# Patient Record
Sex: MALE | Race: BLACK OR AFRICAN AMERICAN | NOT HISPANIC OR LATINO | Employment: FULL TIME | RURAL
[De-identification: names, ages, dates, MRNs, and addresses within clinical notes are randomized per-mention and may not be internally consistent; named-entity substitution may affect disease eponyms.]

---

## 2021-12-08 ENCOUNTER — CLINICAL SUPPORT (OUTPATIENT)
Dept: FAMILY MEDICINE | Facility: CLINIC | Age: 51
End: 2021-12-08
Payer: COMMERCIAL

## 2021-12-08 DIAGNOSIS — Z23 NEED FOR VACCINATION: Primary | ICD-10-CM

## 2021-12-08 PROCEDURE — 0064A COVID-19, MRNA, LNP-S, PF, 100 MCG/0.25 ML DOSE VACCINE (MODERNA BOOSTER): CPT | Mod: ,,, | Performed by: FAMILY MEDICINE

## 2021-12-08 PROCEDURE — 0064A COVID-19, MRNA, LNP-S, PF, 100 MCG/0.25 ML DOSE VACCINE (MODERNA BOOSTER): ICD-10-PCS | Mod: ,,, | Performed by: FAMILY MEDICINE

## 2021-12-08 PROCEDURE — 91306 COVID-19, MRNA, LNP-S, PF, 100 MCG/0.25 ML DOSE VACCINE (MODERNA BOOSTER): ICD-10-PCS | Mod: ,,, | Performed by: FAMILY MEDICINE

## 2021-12-08 PROCEDURE — 91306 COVID-19, MRNA, LNP-S, PF, 100 MCG/0.25 ML DOSE VACCINE (MODERNA BOOSTER): CPT | Mod: ,,, | Performed by: FAMILY MEDICINE

## 2023-03-30 ENCOUNTER — PATIENT OUTREACH (OUTPATIENT)
Dept: FAMILY MEDICINE | Facility: CLINIC | Age: 53
End: 2023-03-30
Payer: COMMERCIAL

## 2024-09-12 ENCOUNTER — HOSPITAL ENCOUNTER (OUTPATIENT)
Dept: RADIOLOGY | Facility: HOSPITAL | Age: 54
Discharge: HOME | End: 2024-09-12
Attending: ORTHOPAEDIC SURGERY
Payer: COMMERCIAL

## 2024-09-12 ENCOUNTER — OFFICE VISIT (OUTPATIENT)
Dept: SPINE | Facility: CLINIC | Age: 54
End: 2024-09-12
Payer: COMMERCIAL

## 2024-09-12 ENCOUNTER — OFFICE VISIT (OUTPATIENT)
Dept: FAMILY MEDICINE | Facility: CLINIC | Age: 54
End: 2024-09-12
Payer: COMMERCIAL

## 2024-09-12 VITALS
BODY MASS INDEX: 35.99 KG/M2 | TEMPERATURE: 98 F | HEIGHT: 69 IN | RESPIRATION RATE: 20 BRPM | DIASTOLIC BLOOD PRESSURE: 96 MMHG | WEIGHT: 243 LBS | OXYGEN SATURATION: 96 % | SYSTOLIC BLOOD PRESSURE: 140 MMHG | HEART RATE: 89 BPM

## 2024-09-12 DIAGNOSIS — M54.42 ACUTE BILATERAL LOW BACK PAIN WITH BILATERAL SCIATICA: ICD-10-CM

## 2024-09-12 DIAGNOSIS — R29.898 WEAKNESS OF BOTH LEGS: Primary | ICD-10-CM

## 2024-09-12 DIAGNOSIS — M54.16 LUMBAR RADICULOPATHY, CHRONIC: Primary | ICD-10-CM

## 2024-09-12 DIAGNOSIS — M54.41 ACUTE BILATERAL LOW BACK PAIN WITH BILATERAL SCIATICA: ICD-10-CM

## 2024-09-12 DIAGNOSIS — Z78.9 STATIN INTOLERANCE: ICD-10-CM

## 2024-09-12 DIAGNOSIS — Z12.11 SCREENING FOR MALIGNANT NEOPLASM OF COLON: ICD-10-CM

## 2024-09-12 DIAGNOSIS — R29.898 WEAKNESS OF BOTH LEGS: ICD-10-CM

## 2024-09-12 DIAGNOSIS — R03.0 ELEVATED BLOOD PRESSURE READING: ICD-10-CM

## 2024-09-12 DIAGNOSIS — E78.5 HYPERLIPIDEMIA, UNSPECIFIED HYPERLIPIDEMIA TYPE: ICD-10-CM

## 2024-09-12 LAB
ALBUMIN SERPL BCP-MCNC: 3.7 G/DL (ref 3.5–5)
ALBUMIN/GLOB SERPL: 0.9 {RATIO}
ALP SERPL-CCNC: 57 U/L (ref 45–115)
ALT SERPL W P-5'-P-CCNC: 34 U/L (ref 16–61)
ANION GAP SERPL CALCULATED.3IONS-SCNC: 8 MMOL/L (ref 7–16)
AST SERPL W P-5'-P-CCNC: 20 U/L (ref 15–37)
BASOPHILS # BLD AUTO: 0.03 K/UL (ref 0–0.2)
BASOPHILS NFR BLD AUTO: 0.6 % (ref 0–1)
BILIRUB SERPL-MCNC: 0.6 MG/DL (ref ?–1.2)
BUN SERPL-MCNC: 14 MG/DL (ref 7–18)
BUN/CREAT SERPL: 14 (ref 6–20)
CALCIUM SERPL-MCNC: 9.4 MG/DL (ref 8.5–10.1)
CHLORIDE SERPL-SCNC: 110 MMOL/L (ref 98–107)
CHOLEST SERPL-MCNC: 286 MG/DL (ref 0–200)
CHOLEST/HDLC SERPL: 5.8 {RATIO}
CO2 SERPL-SCNC: 28 MMOL/L (ref 21–32)
CREAT SERPL-MCNC: 1.02 MG/DL (ref 0.7–1.3)
DIFFERENTIAL METHOD BLD: ABNORMAL
EGFR (NO RACE VARIABLE) (RUSH/TITUS): 88 ML/MIN/1.73M2
EKG 12-LEAD: NORMAL
EOSINOPHIL # BLD AUTO: 0.07 K/UL (ref 0–0.5)
EOSINOPHIL NFR BLD AUTO: 1.4 % (ref 1–4)
ERYTHROCYTE [DISTWIDTH] IN BLOOD BY AUTOMATED COUNT: 15 % (ref 11.5–14.5)
GLOBULIN SER-MCNC: 4 G/DL (ref 2–4)
GLUCOSE SERPL-MCNC: 101 MG/DL (ref 74–106)
HCT VFR BLD AUTO: 42.3 % (ref 40–54)
HDLC SERPL-MCNC: 49 MG/DL (ref 40–60)
HGB BLD-MCNC: 13.4 G/DL (ref 13.5–18)
IMM GRANULOCYTES # BLD AUTO: 0.01 K/UL (ref 0–0.04)
IMM GRANULOCYTES NFR BLD: 0.2 % (ref 0–0.4)
LDLC SERPL CALC-MCNC: 183 MG/DL
LDLC/HDLC SERPL: 3.7 {RATIO}
LYMPHOCYTES # BLD AUTO: 1.32 K/UL (ref 1–4.8)
LYMPHOCYTES NFR BLD AUTO: 26.9 % (ref 27–41)
MCH RBC QN AUTO: 28.4 PG (ref 27–31)
MCHC RBC AUTO-ENTMCNC: 31.7 G/DL (ref 32–36)
MCV RBC AUTO: 89.6 FL (ref 80–96)
MONOCYTES # BLD AUTO: 0.3 K/UL (ref 0–0.8)
MONOCYTES NFR BLD AUTO: 6.1 % (ref 2–6)
MPC BLD CALC-MCNC: 10 FL (ref 9.4–12.4)
NEUTROPHILS # BLD AUTO: 3.18 K/UL (ref 1.8–7.7)
NEUTROPHILS NFR BLD AUTO: 64.8 % (ref 53–65)
NONHDLC SERPL-MCNC: 237 MG/DL
NRBC # BLD AUTO: 0 X10E3/UL
NRBC, AUTO (.00): 0 %
PLATELET # BLD AUTO: 268 K/UL (ref 150–400)
POTASSIUM SERPL-SCNC: 4.3 MMOL/L (ref 3.5–5.1)
PR INTERVAL: NORMAL
PROT SERPL-MCNC: 7.7 G/DL (ref 6.4–8.2)
PRT AXES: NORMAL
QRS DURATION: NORMAL
QT/QTC: NORMAL
RBC # BLD AUTO: 4.72 M/UL (ref 4.6–6.2)
SODIUM SERPL-SCNC: 142 MMOL/L (ref 136–145)
TRIGL SERPL-MCNC: 270 MG/DL (ref 35–150)
VENTRICULAR RATE: NORMAL
VLDLC SERPL-MCNC: 54 MG/DL
WBC # BLD AUTO: 4.91 K/UL (ref 4.5–11)

## 2024-09-12 PROCEDURE — 72110 X-RAY EXAM L-2 SPINE 4/>VWS: CPT | Mod: TC

## 2024-09-12 PROCEDURE — 85025 COMPLETE CBC W/AUTO DIFF WBC: CPT | Mod: ,,, | Performed by: CLINICAL MEDICAL LABORATORY

## 2024-09-12 PROCEDURE — 80061 LIPID PANEL: CPT | Mod: ,,, | Performed by: CLINICAL MEDICAL LABORATORY

## 2024-09-12 PROCEDURE — 99999 PR PBB SHADOW E&M-EST. PATIENT-LVL III: CPT | Mod: PBBFAC,,, | Performed by: ORTHOPAEDIC SURGERY

## 2024-09-12 PROCEDURE — 72110 X-RAY EXAM L-2 SPINE 4/>VWS: CPT | Mod: 26,,, | Performed by: ORTHOPAEDIC SURGERY

## 2024-09-12 PROCEDURE — 80053 COMPREHEN METABOLIC PANEL: CPT | Mod: ,,, | Performed by: CLINICAL MEDICAL LABORATORY

## 2024-09-12 RX ORDER — GABAPENTIN 300 MG/1
300 CAPSULE ORAL 3 TIMES DAILY
Qty: 90 CAPSULE | Refills: 5 | Status: SHIPPED | OUTPATIENT
Start: 2024-09-12

## 2024-09-12 RX ORDER — LATANOPROSTENE BUNOD 0.24 MG/ML
1 SOLUTION/ DROPS OPHTHALMIC NIGHTLY
COMMUNITY
Start: 2024-08-16

## 2024-09-12 RX ORDER — IBUPROFEN 200 MG
200 TABLET ORAL EVERY 6 HOURS PRN
COMMUNITY

## 2024-09-12 NOTE — PROGRESS NOTES
MDM/time:  45-50 minutes spent on this encounter including 15 minutes reviewing imaging and notes, 20 minutes with the patient, 10 minutes documentation    ASSESSMENT:  54 y.o. male with lumbar spondylolithesis L5-S1 with radiculopathy and weakness in bilateral lower extremities     PLAN:  MRI lumbar spine   Neurontin 300mg 1 tab TID   He will come back to office after MRI today     HPI:  54 y.o. male here for evaluation of lower back pain that radiates into the left hip and down the back of the left leg and left groin area.  Had been having pain for the past 3 weeks with no know recent injury and since last night unable to stand or walk without falling.  Patient reports approximately 10-12 years ago was picking up on a trailer felt a pinch in his back in his had some occasional back pain off  since that time.  Three weeks ago started having some back pain that radiated into the left leg and hip.  Denies difficulty with  strength.  Balance issues with multiple falls as of yesterday.  No bladder or bowel incontinence.  Unable to stand or walk without assistance.  Currently taking ibuprofen as needed.  No recent physical therapy.  No prior pain management.  No recent MRI.  No prior spine surgery.  Patient is not a smoker.    IMAGING:  AP, lateral, flexion/extension views of the lumbar spine reviewed     On the AP there is normal coronal alignment.  There are 5 non-rib-bearing lumbar vertebrae.  On the lateral there is decreased lumbar lordosis.  There is spondylotic disease with decreased disc height and osteophyte formation noted.  Grade 1 anterolisthesis at L4-5.     Impression:  Spondylotic changes of the lumbar spine as noted above       Past Medical History:   Diagnosis Date    Hyperlipidemia      No past surgical history on file.  Social History     Tobacco Use    Smoking status: Never     Passive exposure: Past    Smokeless tobacco: Never   Substance Use Topics    Alcohol use: Not Currently    Drug use:  Not Currently      Current Outpatient Medications   Medication Instructions    latanoprostene bunod (VYZULTA) 0.024 % Drop 1 drop, Both Eyes, Nightly        EXAM:  Constitutional  General Appearance:  There is no height or weight on file to calculate BMI., NAD  Psychiatric   Orientation: Oriented to time, oriented to place, oriented to person  Mood and Affect: Active and alert, normal mood, normal affect  Gait and Station   Appearance:  Unable to stand, unable to tandem gait, unable to walk on toes, unable to walk on heels    LUMBAR  Musculoskeletal System   Hips: Normal appearance, no leg length discrepancy, normal motion; left, normal motion; right    Lumbar Spine                   Inspection:  Normal alignment, normal sagittal balance                  Range of motion: decreased flexion, extension, lateral bending, rotation. Pain with range of motion                  Bony Palpation of the Lumbar Spine:  No tenderness of the spinous process, no tenderness of the sacrum, no tenderness of the coccyx                  Bony Palpation of the Right Hip:  No tenderness of the iliac crest, no tenderness of the sciatic notch, no tenderness of the SI joint                  Bony Palpation of the Left Hip:  No tenderness of the iliac crest, no tenderness of the sciatic notch, no tenderness of the SI joint                  Soft Tissue Palpation on the Right:  No tenderness of the paraspinal region, no tenderness of the iliolumbar region                  Soft Tissue Palpation on the Left:  No tenderness of the paraspinal region, no tenderness of the iliolumbar region    Motor Strength   L1 Right:  Hip flexion iliopsoas 5/5    L1 Left:  Hip flexion iliopsoas 5/5              L2-L4 Right:  Knee extension quadriceps 5/5, tibialis anterior 5/5              L2-L4 Left:  Knee extension quadriceps 5/5, tibialis anterior 5/5   L5 Right:  Extensor hallucis llongus 5/5,    L5 Left:  Extensor hallucis longus 5/5,    S1 Right:  Plantar  flexion gastrocnemius 5/5   S1 Left:  Plantar flexion gastrocnemius 5/5    Neurological System   Ankle Reflex Right:  normal   Ankle Reflex Left: normal   Knee Reflex Right:  normal   Knee Reflex Left:  normal   Sensation on the Right:  L2 normal, L3 normal, L4 normal, L5 normal, S1 normal   Sensation on the Left:  L2 normal, L3 normal, L4 normal, L5 normal, S1 normal              Special Test on the Right:  Seated straight leg raising test negative, no clonus of the ankle              Special Test on the Left:  Seated straight leg raising test positive, no clonus of the ankle    Skin   Lumbosacral Spine:  Normal skin    Cardiovascular System   Arterial Pulses Right:  Posterior tibialis normal, dorsalis pedis normal   Arterial Pulses Left:  Posterior tibialis normal, dorsalis pedis normal   Edema Right: None   Edema Left:  None

## 2024-09-12 NOTE — PROGRESS NOTES
Migel Eng is a 54 y.o. male seen today for his initial visit to [facility].  He has a past medical history of hyperlipidemia with intolerance of Crestor secondary to fatigue and myalgias.  Patient is currently not treated.  Patient's blood pressures are persistently elevated here in the office today and he has a family history of hypertension.  Currently he denies chest pain or shortness for breath.  His primary reason for his visit today.  His acute onset of severe low back pain initially radiating to his left lower extremity now also radiating to the right with progressive and severe weakness to his legs bilaterally.  The patient's wife reports he has fallen 3 times today alone.  Currently the patient is in a wheelchair.  Migel's mobility limitation cannot be sufficiently resolved by the use of a cane. His functional mobility deficit can be sufficiently resolved with the use of a Walker. Patient's mobility limitation significantly impairs their ability to participate in one of more activities of daily living.  The use of a Walker will significantly improve the patient's ability to participate in [facility] and the patient will use it on regular basis in the home. Migel Eng has a mobility limitation that significantly impairs his ability to participate in one or more mobility related activities in the community. The mobility limitation cannot be sufficiently resolved by the use of a cane or walker. The use of a transport wheelchair will significantly improve the patients ability to participate in the community and the patient will use it on regular basis outside the home. Migel has expressed his willingness to use a transport wheelchair outside the home. he also has a caregiver who is available, willing, and able to provide assistance with the wheelchair when needed.     Past Medical History:   Diagnosis Date    Hyperlipidemia      Family History   Problem Relation Name Age of Onset    Cancer  Mother      Heart disease Father      Hypertension Father      Cancer Maternal Grandmother       Current Outpatient Medications on File Prior to Visit   Medication Sig Dispense Refill    latanoprostene bunod (VYZULTA) 0.024 % Drop Place 1 drop into both eyes every evening.       No current facility-administered medications on file prior to visit.     Immunization History   Administered Date(s) Administered    COVID-19 MRNA, LN-S PF (MODERNA HALF 0.25 ML DOSE) 12/08/2021       Review of Systems   Constitutional:  Negative for fever, malaise/fatigue and weight loss.   Respiratory:  Negative for shortness of breath.    Cardiovascular:  Negative for chest pain and palpitations.   Gastrointestinal:  Negative for nausea and vomiting.   Musculoskeletal:  Positive for back pain, falls and myalgias.   Neurological:  Positive for focal weakness.   Psychiatric/Behavioral:  Negative for depression.         Vitals:    09/12/24 0920   BP: (!) 140/96   Pulse:    Resp:    Temp:        Physical Exam  Vitals reviewed.   Constitutional:       Appearance: Normal appearance.   HENT:      Head: Normocephalic.   Eyes:      Extraocular Movements: Extraocular movements intact.      Conjunctiva/sclera: Conjunctivae normal.      Pupils: Pupils are equal, round, and reactive to light.   Neck:      Thyroid: No thyroid mass or thyromegaly.   Cardiovascular:      Rate and Rhythm: Normal rate and regular rhythm.      Heart sounds: Normal heart sounds. No murmur heard.     No gallop.   Pulmonary:      Effort: Pulmonary effort is normal. No respiratory distress.      Breath sounds: Normal breath sounds. No wheezing or rales.   Musculoskeletal:      Lumbar back: Spasms present. No tenderness. Decreased range of motion.   Skin:     General: Skin is warm and dry.      Coloration: Skin is not jaundiced or pale.   Neurological:      Mental Status: He is alert.      Motor: Weakness and abnormal muscle tone present. No tremor.      Comments: Patient has  decreased strength to the left lower extremity.  Straight leg raise his appear to be negative.   Psychiatric:         Mood and Affect: Mood normal.         Behavior: Behavior normal.         Thought Content: Thought content normal.         Judgment: Judgment normal.          Assessment and Plan  1. Weakness of both legs  -     WHEELCHAIR FOR HOME USE  -     WALKER FOR HOME USE  -     Ambulatory referral/consult to Spine Surgery; Future; Expected date: 09/19/2024    2. Screening for malignant neoplasm of colon  -     Colonoscopy; Future; Expected date: 09/12/2024    3. Elevated blood pressure reading  -     POCT EKG 12-LEAD (Manually Resulted by Ordering Provider)  -     CBC Auto Differential; Future; Expected date: 09/12/2024  -     Comprehensive Metabolic Panel; Future; Expected date: 09/12/2024  -     Lipid Panel; Future; Expected date: 09/12/2024    4. Acute bilateral low back pain with bilateral sciatica  -     Ambulatory referral/consult to Spine Surgery; Future; Expected date: 09/19/2024    5. Hyperlipidemia, unspecified hyperlipidemia type    6. Statin intolerance            EKG:  Normal sinus rhythm with indeterminate axis.                       Dr. Uribe is kind enough to see the patient this afternoon.    Return to clinic in 2 weeks with home blood pressure log.  Patient in his wife will stop by with her new blood pressure cuff to confirm accuracy.    The patient has no Health Maintenance topics of status Not Due

## 2024-09-17 DIAGNOSIS — D64.9 ANEMIA, UNSPECIFIED TYPE: Primary | ICD-10-CM

## 2024-09-19 ENCOUNTER — PATIENT MESSAGE (OUTPATIENT)
Dept: FAMILY MEDICINE | Facility: CLINIC | Age: 54
End: 2024-09-19
Payer: COMMERCIAL

## 2024-09-24 ENCOUNTER — OFFICE VISIT (OUTPATIENT)
Dept: FAMILY MEDICINE | Facility: CLINIC | Age: 54
End: 2024-09-24
Payer: COMMERCIAL

## 2024-09-24 VITALS
RESPIRATION RATE: 17 BRPM | SYSTOLIC BLOOD PRESSURE: 134 MMHG | OXYGEN SATURATION: 96 % | WEIGHT: 237 LBS | HEART RATE: 86 BPM | BODY MASS INDEX: 35.1 KG/M2 | DIASTOLIC BLOOD PRESSURE: 86 MMHG | HEIGHT: 69 IN | TEMPERATURE: 99 F

## 2024-09-24 DIAGNOSIS — Z11.4 SCREENING FOR HIV (HUMAN IMMUNODEFICIENCY VIRUS): ICD-10-CM

## 2024-09-24 DIAGNOSIS — Z13.1 SCREENING FOR DIABETES MELLITUS: ICD-10-CM

## 2024-09-24 DIAGNOSIS — R29.898 WEAKNESS OF BOTH LEGS: ICD-10-CM

## 2024-09-24 DIAGNOSIS — E78.5 HYPERLIPIDEMIA, UNSPECIFIED HYPERLIPIDEMIA TYPE: Primary | ICD-10-CM

## 2024-09-24 DIAGNOSIS — M51.9 LUMBAR DISC DISEASE: ICD-10-CM

## 2024-09-24 DIAGNOSIS — Z11.59 SCREENING FOR VIRAL DISEASE: ICD-10-CM

## 2024-09-24 PROCEDURE — 3008F BODY MASS INDEX DOCD: CPT | Mod: ,,, | Performed by: FAMILY MEDICINE

## 2024-09-24 PROCEDURE — 1159F MED LIST DOCD IN RCRD: CPT | Mod: ,,, | Performed by: FAMILY MEDICINE

## 2024-09-24 PROCEDURE — 86803 HEPATITIS C AB TEST: CPT | Mod: ,,, | Performed by: CLINICAL MEDICAL LABORATORY

## 2024-09-24 PROCEDURE — 3079F DIAST BP 80-89 MM HG: CPT | Mod: ,,, | Performed by: FAMILY MEDICINE

## 2024-09-24 PROCEDURE — 83036 HEMOGLOBIN GLYCOSYLATED A1C: CPT | Mod: ,,, | Performed by: CLINICAL MEDICAL LABORATORY

## 2024-09-24 PROCEDURE — 99214 OFFICE O/P EST MOD 30 MIN: CPT | Mod: ,,, | Performed by: FAMILY MEDICINE

## 2024-09-24 PROCEDURE — 3075F SYST BP GE 130 - 139MM HG: CPT | Mod: ,,, | Performed by: FAMILY MEDICINE

## 2024-09-24 PROCEDURE — 87389 HIV-1 AG W/HIV-1&-2 AB AG IA: CPT | Mod: ,,, | Performed by: CLINICAL MEDICAL LABORATORY

## 2024-09-24 PROCEDURE — 1160F RVW MEDS BY RX/DR IN RCRD: CPT | Mod: ,,, | Performed by: FAMILY MEDICINE

## 2024-09-24 RX ORDER — EZETIMIBE 10 MG/1
10 TABLET ORAL DAILY
Qty: 30 TABLET | Refills: 5 | Status: SHIPPED | OUTPATIENT
Start: 2024-09-24 | End: 2025-09-24

## 2024-09-24 RX ORDER — BIMATOPROST 0.1 MG/ML
1 SOLUTION/ DROPS OPHTHALMIC NIGHTLY
COMMUNITY
Start: 2024-09-17

## 2024-09-24 NOTE — PROGRESS NOTES
Migel Eng is a 54 y.o. male seen today for  follow-up on his recent lab work.  His lipids were markedly elevated with an LDL of 183.  His triglycerides are also mildly elevated and we discussed a low-fat low starch diet and increase intake of oatmeal and almonds.  Currently the patient is attending a chiropractic clinic to help with his low back pain due to severe lumbar disc disease and weakness affecting his legs bilaterally.  Currently the patient ambulates with the use of a wheelchair.  We discussed a physical therapy evaluation to help with strengthening.  We also discussed a trial of Zetia and reviewed the side effects of this medication.  This patient is statin intolerant and with statin medications had severe muscle pain and weakness.    Past Medical History:   Diagnosis Date    Hyperlipidemia      Family History   Problem Relation Name Age of Onset    Cancer Mother      Heart disease Father      Hypertension Father      Cancer Maternal Grandmother       Current Outpatient Medications on File Prior to Visit   Medication Sig Dispense Refill    gabapentin (NEURONTIN) 300 MG capsule Take 1 capsule (300 mg total) by mouth 3 (three) times daily. 90 capsule 5    ibuprofen (ADVIL,MOTRIN) 200 MG tablet Take 200 mg by mouth every 6 (six) hours as needed for Pain.      L. acidophilus/Bifid. animalis (SUPER PROBIOTIC ORAL) Take by mouth.      LUMIGAN 0.01 % Drop Place 1 drop into both eyes every evening.      latanoprostene bunod (VYZULTA) 0.024 % Drop Place 1 drop into both eyes every evening. (Patient not taking: Reported on 9/24/2024)       No current facility-administered medications on file prior to visit.     Immunization History   Administered Date(s) Administered    COVID-19 MRNA, LN-S PF (MODERNA HALF 0.25 ML DOSE) 12/08/2021       Review of Systems   Constitutional:  Negative for fever, malaise/fatigue and weight loss.   Respiratory:  Negative for shortness of breath.    Cardiovascular:  Negative for  chest pain and palpitations.   Gastrointestinal:  Negative for nausea and vomiting.   Musculoskeletal:  Positive for back pain and myalgias.   Neurological:  Positive for weakness.   Psychiatric/Behavioral:  Negative for depression.         Vitals:    09/24/24 0851   BP: 134/86   Pulse: 86   Resp: 17   Temp: 98.5 °F (36.9 °C)       Physical Exam  Vitals reviewed.   Constitutional:       Appearance: Normal appearance.   HENT:      Head: Normocephalic.   Eyes:      Extraocular Movements: Extraocular movements intact.      Conjunctiva/sclera: Conjunctivae normal.      Pupils: Pupils are equal, round, and reactive to light.   Neck:      Thyroid: No thyroid mass or thyromegaly.   Cardiovascular:      Rate and Rhythm: Normal rate and regular rhythm.      Heart sounds: Normal heart sounds. No murmur heard.     No gallop.   Pulmonary:      Effort: Pulmonary effort is normal. No respiratory distress.      Breath sounds: Normal breath sounds. No wheezing or rales.   Musculoskeletal:      Comments: Patient is currently wheelchair-bound.  He can stand for short amounts of time.   Skin:     General: Skin is warm and dry.      Coloration: Skin is not jaundiced or pale.   Neurological:      Mental Status: He is alert.   Psychiatric:         Mood and Affect: Mood normal.         Behavior: Behavior normal.         Thought Content: Thought content normal.         Judgment: Judgment normal.          Assessment and Plan  1. Hyperlipidemia, unspecified hyperlipidemia type  -     ezetimibe (ZETIA) 10 mg tablet; Take 1 tablet (10 mg total) by mouth once daily.  Dispense: 30 tablet; Refill: 5  -     CBC Auto Differential; Future; Expected date: 12/24/2024  -     Comprehensive Metabolic Panel; Future; Expected date: 12/24/2024  -     Lipid Panel; Future; Expected date: 12/24/2024    2. Screening for viral disease  -     Hepatitis C Antibody; Future; Expected date: 09/24/2024    3. Screening for diabetes mellitus  -     Hemoglobin A1C;  Future; Expected date: 09/24/2024    4. Screening for HIV (human immunodeficiency virus)  -     HIV 1/2 Ag/Ab (4th Gen); Future; Expected date: 09/24/2024    5. Weakness of both legs  -     Ambulatory referral/consult to Physical/Occupational Therapy; Future; Expected date: 10/01/2024    6. Lumbar disc disease  -     Ambulatory referral/consult to Physical/Occupational Therapy; Future; Expected date: 10/01/2024             Return to clinic in 3 months after his follow-up lab work is done or as needed.  Patient is also welcome to walk in for his flu vaccine in October.  His blood pressures currently are well controlled.    Health Maintenance Topics with due status: Not Due       Topic Last Completion Date    Lipid Panel 09/12/2024

## 2024-09-25 LAB
HCV AB SER QL: NORMAL
HIV 1+O+2 AB SERPL QL: NORMAL

## 2024-09-26 LAB
EST. AVERAGE GLUCOSE BLD GHB EST-MCNC: 123 MG/DL
HBA1C MFR BLD HPLC: 5.9 % (ref 4.5–6.6)

## 2024-09-27 ENCOUNTER — TELEPHONE (OUTPATIENT)
Dept: FAMILY MEDICINE | Facility: CLINIC | Age: 54
End: 2024-09-27
Payer: COMMERCIAL

## 2024-09-27 NOTE — TELEPHONE ENCOUNTER
----- Message from Mitchell Gore MD sent at 9/27/2024  7:52 AM CDT -----  Patient is prediabetic and should decrease intake carbohydrates and recheck A1c in 3 months.  Please check with Diamond to see if Mrs. Swanson sees prediabetic patient's as well

## 2024-10-01 ENCOUNTER — CLINICAL SUPPORT (OUTPATIENT)
Dept: REHABILITATION | Facility: HOSPITAL | Age: 54
End: 2024-10-01
Attending: FAMILY MEDICINE
Payer: COMMERCIAL

## 2024-10-01 DIAGNOSIS — R53.1 DECREASED STRENGTH: ICD-10-CM

## 2024-10-01 DIAGNOSIS — M51.9 LUMBAR DISC DISEASE: Primary | ICD-10-CM

## 2024-10-01 DIAGNOSIS — R52 PAIN: ICD-10-CM

## 2024-10-01 DIAGNOSIS — R29.898 WEAKNESS OF BOTH LEGS: ICD-10-CM

## 2024-10-01 PROCEDURE — 97162 PT EVAL MOD COMPLEX 30 MIN: CPT

## 2024-10-01 NOTE — PLAN OF CARE
[facility]  Physical Therapy Initial Evaluation    Date: 10/1/2024   Name: Migel Eng  Clinic Number: [id number]    Therapy Diagnosis:   Encounter Diagnoses   Name Primary?    Weakness of both legs     Lumbar disc disease      Physician: Mitchell Gore MD    Physician Orders: PT Eval and Treat   Medical Diagnosis from Referral: Lumbar Pain  Evaluation Date: 10/1/2024  Authorization Period Expiration: 9/24/25  Plan of Care Expiration: 12/24/24  Visit # / Visits authorized: 1/ ?    Time In: 1113  Time Out: 1200  Total Appointment Time (timed & untimed codes): 47 minutes    Precautions: Standard    Subjective   Date of onset: Chronic condition    History of current condition - Migel reports: pain has been in the low back for ~ 6 weeks but the past month has been excruciating. Bilateral legs will buckle and has had 3 falls in the past month. When the pain was bad there was bilateral radicular symptoms into the legs but now no longer have the radicular symptoms. Currently, the legs are just weak. Left handed. Denies numbness/tingling in the legs. No loss of bowel/bladder control. Sleep well. Intermittent low back pain dull ache/throbbing. Worse with standing/walking. Seeing chiropractor and get relief with lumbar adjustments.   Relief with medication, heat and rest.      Currently working: road construction      Medical History:   Past Medical History:   Diagnosis Date    Hyperlipidemia        Surgical History:   Migel Eng  has no past surgical history on file.    Medications:   Migel has a current medication list which includes the following prescription(s): ezetimibe, gabapentin, ibuprofen, l. acidophilus/bifid. animalis, vyzulta, and lumigan.    Allergies:   Review of patient's allergies indicates:   Allergen Reactions    Penicillins Swelling        Imaging, MRI studies, bone scan films: MRI LUMBAR SPINE WITHOUT CONTRAST  CLINICAL HISTORY:  Lumbar radiculopathy, symptoms  persist with conservative treatment; Radiculopathy, lumbar region  TECHNIQUE:  Multiplanar, multisequence MR images were acquired from the thoracolumbar junction to the sacrum without the administration of contrast.  COMPARISON:  Radiographs 09/12/2024.  FINDINGS:  Morphology: Endplate centered mixed Modic type 1 and Modic type 2 endplate changes are prominent at L3-L4, L4-L5, and L5-S1 with associated Schmorl's nodes and endplate centered osteophytes..  No fractures or suspicious lesions identified.  Marrow signal is otherwise within normal limits aside from active inflammatory facet arthropathy changes on the left at L4-L5.  Alignment: Straightening of the expected normal lumbar lordosis and grade 1 anterolisthesis of L4 on L5.  Cord: Normal in contour, caliber, and signal intensity.  Conus positioning is within normal limits.  Redundancy of the proximal cauda salvador nerve roots secondary to severe spinal canal/thecal sac narrowing at the level the caudal lumbar spine and sacral canal.  Disc levels:  There is likely a component superimposed developmental spinal canal narrowing from L3 caudally.  L1-L2: Mild degenerative disc height loss and desiccation with shallow disc bulging lateralizing to the left producing mild encroachment of the left subarticular recess and spinal canal and mild left foraminal narrowing.  The right foramen is patent.  L2-L3: Mild bilateral facet arthrosis.  Otherwise within normal limits.  The spinal canal and foramina are patent.  L3-L4: Severe degenerative disc height loss.  Disc bulging and osteophytic endplate spurring as well as moderate bilateral facet arthrosis with ligamentum flavum thickening and prominence of the epidural fat producing moderate circumferential spinal canal narrowing and severe narrowing of the thecal sac as well as moderate to severe bilateral foraminal narrowing.  L4-L5: Severe degenerative disc height loss with disc bulging and osteophytic endplate spurring as  well as severe bilateral facet arthrosis with ligamentum flavum thickening and prominence of the epidural fat producing severe spinal canal and thecal sac narrowing as well as severe bilateral foraminal narrowing.  L5-S1: Severe degenerative disc height loss as well as shallow disc bulging and moderate to severe bilateral facet arthrosis producing overall mild narrowing of the spinal canal but severe narrowing of the thecal sac secondary to prominence of the epidural fat.  Severe bilateral foraminal narrowing.  Other: Visualized paraspinal soft tissues are within normal limits.  Impression:  1. Severe degenerative changes from L3-L4 to L5-S1 on a background of epidural lipomatosis and likely component of developmental spinal canal narrowing combining to produce moderate/severe spinal canal and thecal sac narrowing as described above with redundancy of the proximal cauda salvador nerve roots.  2. Multilevel foraminal narrowing notable for moderate to severe bilateral L3-L4, severe bilateral L4-L5, and severe bilateral L5-S1 narrowing.         AP, lateral, flexion/extension views of the lumbar spine reviewed  On the AP there is normal coronal alignment.  There are 5 non-rib-bearing lumbar vertebrae.  On the lateral there is decreased lumbar lordosis.  There is spondylotic disease with decreased disc height and osteophyte formation noted.  Grade 1 anterolisthesis at L4-5.  Impression:  Spondylotic changes of the lumbar spine as noted above        Pain:  Current 0/10, worst 10/10,  Location: bilateral back , lower legs, and upper legs   Description: weak legs  Aggravating Factors: Standing and Walking  Easing Factors: lying down and rest        Objective     Patient arrived in w/c     Posture:  Level pelvis in supine    Gait:  Bilateral legs very unstable with inability to lock into terminal knee extension in stance. Gait is not assessed secondary to safety.     Manual muscle test:  Right hip flexion 4-/5, left hip  flexion 3+/5  Right hamstring 5/5, left hamstring 4+/5  Bilateral quad 5/5  Bilateral DF 5/5  Left glute med 4/5, right glue med 4/5    Palpation:  Mild hypomobility left L4 rotation. No tenderness with palpation     Sensation:  Bilateral light touch intact for lower extremities dermatome    Special Tests:   Bilateral patellar tendon reflex 2+ normal  Bilateral light touch intact for lower extremities dermatome  (-) bilateral sitting slump test   No leg length difference  Transfers with trunk in flexion and uses a compromised stand pivot transfer from w/c to mat.       Limitation/Restriction for FOTO Survey    Therapist reviewed FOTO scores for Migel Eng on 10/1/2024.   FOTO documents entered into EP[employer] - see Media section.    Intake Score: 21%       Mechanical lumbar 90/90 traction x 15 minutes 45/15 rest 75lb pull       Home Exercises and Patient Education Provided    Education provided:   Eval Findings  Patient educated on biomechanical justification for therapeutic exercise and importance of compliance with HEP in order to improve overall impairments and QOL   Patient was educated on all the above exercise prior/during/after for proper posture, positioning, and execution for safe performance with home exercise program if exercises were given during evaluation.  Patient educated on the importance of improved core and upper and lower extremity strength in order to improve alignment of the spine and upper and lower extremities with static positions and dynamic movement.   Patient educated on the importance of strong core and lower extremity musculature in order to improve both static and dynamic balance, improve gait mechanics, reduce fall risk and improve household and community mobility.     Written Home Exercises Provided:  - . Evaluation findings discussed.  Exercises were reviewed and Migel was able to demonstrate them prior to the end of the session.  Migel demonstrated good  understanding of the  education provided.       Assessment   Migel is a 54 y.o. male referred to outpatient Physical Therapy with a medical diagnosis of lumbar pain. Patient presents with good sensitivity to the lower extremities dermatome and patellar tendon reflex. Patient does not have any pain with palpation. When the pain first began the patient did have bilateral radicular symptoms but currently the pain and radicular symptoms have eased and the main concern for the patient is leg weakness bilaterally. Patient is unable to stand and walk secondary to the legs giving way and instability. Patient has to use a w/c being pushed by spouse for ambulation but is able to pivot from chair to mat and does have good bed mobility. Patient does go to a chiropractor for adjustments and states he does feel better after those appointments. The reason patient was sent to therapy was for lower extremities strengthening. Lumbar traction was completed today for spinal decompression and patient was educated on traction expectations and outcome. Patient will benefit from skilled physical therapy at this time to address deficits.     Case Conference with Janice Balderas PTA    Patient prognosis is Guarded.     Patient will benefit from skilled outpatient Physical Therapy to address the deficits stated above and in the chart below, provide patient /family education, and to maximize patientt's level of independence.     Plan of care discussed with patient: Yes  Patient's spiritual, cultural and educational needs considered and patient is agreeable to the plan of care and goals as stated below:     Anticipated Barriers for therapy: inability to stand pre-surgery      Medical Necessity is demonstrated by the following  History  Co-morbidities and personal factors that may impact the plan of care [x] LOW: no personal factors / co-morbidities  [] MODERATE: 1-2 personal factors / co-morbidities  [] HIGH: 3+ personal factors / co-morbidities    Moderate / High  Support Documentation:   Co-morbidities affecting plan of care: -    Personal Factors:   lifestyle     Examination  Body Structures and Functions, activity limitations and participation restrictions that may impact the plan of care [] LOW: addressing 1-2 elements  [x] MODERATE: 3+ elements  [] HIGH: 4+ elements (please support below)    Moderate / High Support Documentation: see above findings     Clinical Presentation [] LOW: stable  [x] MODERATE: Evolving  [] HIGH: Unstable     Decision Making/ Complexity Score: moderate         Goals:  Short Term Goals: 6 weeks   Patient will improve stance time to 1 minute with no UE support and no loss of balance  Patient will maintain prolonged positions x 15 minutes with  3/10 pain  Patient will report a change in pain symptoms from constant to intermittent     Long Term Goals: 12 weeks   Patient will improve manual muscle test to 5/5 for bilateral lower extremities stability  Patient will maintain prolonged positions x 30 minutes with  0 /10 pain  Patient will be able to ambulate with no A.D. and no gait deviations by D/C  Patient will be able to return to work full duty with no gait limitations by D/C  Patient will report no radicular symptoms by D/C  Patient will be independent with home exercise program by D/C    Plan   Plan of care Certification: 10/1/2024 to 12/24/24.    Outpatient Physical Therapy 2 times weekly for 12 weeks to include the following interventions:  76965 [PT re-evaluation], 61242 [therapeutic exercise], 19413 [neuromuscular re-education], 44063 [manual therapy], 02007 [therapeutic activities], 87849 [aquatic therapy], 29617 [ultrasound], 43235 [unattended electrical stimulation], 23208 [mechanical traction], and 09998 [vasopneumatic device]    JESSIE CARLSON, PT        I CERTIFY THE NEED FOR THESE SERVICES FURNISHED UNDER THIS PLAN OF TREATMENT AND WHILE UNDER MY CARE.    Physician's comments:      Physician's Signature:  ____________________________________________Date________________        Please fax this MD signed form to:  [facility]  [phone] fax

## 2024-10-04 ENCOUNTER — TELEPHONE (OUTPATIENT)
Dept: FAMILY MEDICINE | Facility: CLINIC | Age: 54
End: 2024-10-04
Payer: COMMERCIAL

## 2024-10-11 ENCOUNTER — TELEPHONE (OUTPATIENT)
Dept: FAMILY MEDICINE | Facility: CLINIC | Age: 54
End: 2024-10-11
Payer: COMMERCIAL

## 2024-10-11 NOTE — TELEPHONE ENCOUNTER
Pt spouse notified that patients A1c is in the predm range and that Dr Gore recommends that he decrease his intake of carbs and recheck in 3 months, she voiced understanding. Dr Gore aware that DM education is not for predm.

## 2024-11-19 PROBLEM — R53.1 DECREASED STRENGTH: Status: RESOLVED | Noted: 2024-10-01 | Resolved: 2024-11-19

## 2024-11-19 PROBLEM — R52 PAIN: Status: RESOLVED | Noted: 2024-10-01 | Resolved: 2024-11-19

## 2024-11-27 ENCOUNTER — PATIENT MESSAGE (OUTPATIENT)
Dept: RESEARCH | Facility: HOSPITAL | Age: 54
End: 2024-11-27

## 2024-12-06 ENCOUNTER — PATIENT MESSAGE (OUTPATIENT)
Dept: ADMINISTRATIVE | Facility: HOSPITAL | Age: 54
End: 2024-12-06

## 2024-12-07 DIAGNOSIS — Z12.11 SCREENING FOR COLON CANCER: ICD-10-CM

## 2024-12-30 ENCOUNTER — OFFICE VISIT (OUTPATIENT)
Dept: FAMILY MEDICINE | Facility: CLINIC | Age: 54
End: 2024-12-30
Payer: COMMERCIAL

## 2024-12-30 VITALS
OXYGEN SATURATION: 97 % | SYSTOLIC BLOOD PRESSURE: 138 MMHG | TEMPERATURE: 98 F | RESPIRATION RATE: 18 BRPM | HEART RATE: 85 BPM | HEIGHT: 69 IN | BODY MASS INDEX: 35.4 KG/M2 | DIASTOLIC BLOOD PRESSURE: 88 MMHG | WEIGHT: 239 LBS

## 2024-12-30 DIAGNOSIS — Z23 NEED FOR IMMUNIZATION AGAINST INFLUENZA: ICD-10-CM

## 2024-12-30 DIAGNOSIS — R73.03 PREDIABETES: Primary | ICD-10-CM

## 2024-12-30 DIAGNOSIS — E78.5 HYPERLIPIDEMIA, UNSPECIFIED HYPERLIPIDEMIA TYPE: ICD-10-CM

## 2024-12-30 LAB
ALBUMIN SERPL BCP-MCNC: 3.9 G/DL (ref 3.5–5)
ALBUMIN/GLOB SERPL: 1.1 {RATIO}
ALP SERPL-CCNC: 64 U/L (ref 40–150)
ALT SERPL W P-5'-P-CCNC: 28 U/L
ANION GAP SERPL CALCULATED.3IONS-SCNC: 13 MMOL/L (ref 7–16)
AST SERPL W P-5'-P-CCNC: 23 U/L (ref 5–34)
BASOPHILS # BLD AUTO: 0.03 K/UL (ref 0–0.2)
BASOPHILS NFR BLD AUTO: 0.7 % (ref 0–1)
BILIRUB SERPL-MCNC: 0.6 MG/DL
BUN SERPL-MCNC: 14 MG/DL (ref 8–26)
BUN/CREAT SERPL: 15 (ref 6–20)
CALCIUM SERPL-MCNC: 9 MG/DL (ref 8.4–10.2)
CHLORIDE SERPL-SCNC: 107 MMOL/L (ref 98–107)
CHOLEST SERPL-MCNC: 248 MG/DL
CHOLEST/HDLC SERPL: 6 {RATIO}
CO2 SERPL-SCNC: 25 MMOL/L (ref 22–29)
CREAT SERPL-MCNC: 0.93 MG/DL (ref 0.72–1.25)
DIFFERENTIAL METHOD BLD: ABNORMAL
EGFR (NO RACE VARIABLE) (RUSH/TITUS): 98 ML/MIN/1.73M2
EOSINOPHIL # BLD AUTO: 0.18 K/UL (ref 0–0.5)
EOSINOPHIL NFR BLD AUTO: 4.2 % (ref 1–4)
ERYTHROCYTE [DISTWIDTH] IN BLOOD BY AUTOMATED COUNT: 15.2 % (ref 11.5–14.5)
EST. AVERAGE GLUCOSE BLD GHB EST-MCNC: 111 MG/DL
GLOBULIN SER-MCNC: 3.7 G/DL (ref 2–4)
GLUCOSE SERPL-MCNC: 84 MG/DL (ref 74–100)
HBA1C MFR BLD HPLC: 5.5 %
HCT VFR BLD AUTO: 41.4 % (ref 40–54)
HDLC SERPL-MCNC: 41 MG/DL (ref 35–60)
HGB BLD-MCNC: 13 G/DL (ref 13.5–18)
IMM GRANULOCYTES # BLD AUTO: 0.01 K/UL (ref 0–0.04)
IMM GRANULOCYTES NFR BLD: 0.2 % (ref 0–0.4)
LDLC SERPL CALC-MCNC: 166 MG/DL
LDLC/HDLC SERPL: 4 {RATIO}
LYMPHOCYTES # BLD AUTO: 2.19 K/UL (ref 1–4.8)
LYMPHOCYTES NFR BLD AUTO: 51 % (ref 27–41)
MCH RBC QN AUTO: 28.1 PG (ref 27–31)
MCHC RBC AUTO-ENTMCNC: 31.4 G/DL (ref 32–36)
MCV RBC AUTO: 89.6 FL (ref 80–96)
MONOCYTES # BLD AUTO: 0.29 K/UL (ref 0–0.8)
MONOCYTES NFR BLD AUTO: 6.8 % (ref 2–6)
MPC BLD CALC-MCNC: 9.5 FL (ref 9.4–12.4)
NEUTROPHILS # BLD AUTO: 1.59 K/UL (ref 1.8–7.7)
NEUTROPHILS NFR BLD AUTO: 37.1 % (ref 53–65)
NONHDLC SERPL-MCNC: 207 MG/DL
NRBC # BLD AUTO: 0 X10E3/UL
NRBC, AUTO (.00): 0 %
PLATELET # BLD AUTO: 336 K/UL (ref 150–400)
POTASSIUM SERPL-SCNC: 4.5 MMOL/L (ref 3.5–5.1)
PROT SERPL-MCNC: 7.6 G/DL (ref 6.4–8.3)
RBC # BLD AUTO: 4.62 M/UL (ref 4.6–6.2)
SODIUM SERPL-SCNC: 140 MMOL/L (ref 136–145)
TRIGL SERPL-MCNC: 203 MG/DL (ref 34–140)
VLDLC SERPL-MCNC: 41 MG/DL
WBC # BLD AUTO: 4.29 K/UL (ref 4.5–11)

## 2024-12-30 PROCEDURE — 85025 COMPLETE CBC W/AUTO DIFF WBC: CPT | Mod: ,,, | Performed by: CLINICAL MEDICAL LABORATORY

## 2024-12-30 PROCEDURE — 99213 OFFICE O/P EST LOW 20 MIN: CPT | Mod: 25,,, | Performed by: FAMILY MEDICINE

## 2024-12-30 PROCEDURE — 1160F RVW MEDS BY RX/DR IN RCRD: CPT | Mod: ,,, | Performed by: FAMILY MEDICINE

## 2024-12-30 PROCEDURE — 3079F DIAST BP 80-89 MM HG: CPT | Mod: ,,, | Performed by: FAMILY MEDICINE

## 2024-12-30 PROCEDURE — 83036 HEMOGLOBIN GLYCOSYLATED A1C: CPT | Mod: ,,, | Performed by: CLINICAL MEDICAL LABORATORY

## 2024-12-30 PROCEDURE — 3075F SYST BP GE 130 - 139MM HG: CPT | Mod: ,,, | Performed by: FAMILY MEDICINE

## 2024-12-30 PROCEDURE — 3008F BODY MASS INDEX DOCD: CPT | Mod: ,,, | Performed by: FAMILY MEDICINE

## 2024-12-30 PROCEDURE — 1159F MED LIST DOCD IN RCRD: CPT | Mod: ,,, | Performed by: FAMILY MEDICINE

## 2024-12-30 PROCEDURE — 90471 IMMUNIZATION ADMIN: CPT | Mod: ,,, | Performed by: FAMILY MEDICINE

## 2024-12-30 PROCEDURE — 3044F HG A1C LEVEL LT 7.0%: CPT | Mod: ,,, | Performed by: FAMILY MEDICINE

## 2024-12-30 PROCEDURE — 80053 COMPREHEN METABOLIC PANEL: CPT | Mod: ,,, | Performed by: CLINICAL MEDICAL LABORATORY

## 2024-12-30 PROCEDURE — 90661 CCIIV3 VAC ABX FR 0.5 ML IM: CPT | Mod: ,,, | Performed by: FAMILY MEDICINE

## 2024-12-30 PROCEDURE — 80061 LIPID PANEL: CPT | Mod: ,,, | Performed by: CLINICAL MEDICAL LABORATORY

## 2024-12-30 RX ORDER — EZETIMIBE 10 MG/1
10 TABLET ORAL DAILY
Qty: 30 TABLET | Refills: 5 | Status: SHIPPED | OUTPATIENT
Start: 2024-12-30 | End: 2025-12-30

## 2024-12-30 NOTE — PROGRESS NOTES
Migel Eng is a 54 y.o. male seen today for follow-up on his hyperlipidemia and new onset prediabetes.  Patient has markedly improved his diet in his consuming more oatmeal and has given up sodas.  He denies any chest pain or shortness for breath and is better and meeting his ADLs with improved low back pain now able to ambulate using a Rollator.      Past Medical History:   Diagnosis Date    Hyperlipidemia      Family History   Problem Relation Name Age of Onset    Cancer Mother      Heart disease Father      Hypertension Father      Cancer Maternal Grandmother       Current Outpatient Medications on File Prior to Visit   Medication Sig Dispense Refill    gabapentin (NEURONTIN) 300 MG capsule Take 1 capsule (300 mg total) by mouth 3 (three) times daily. 90 capsule 5    ibuprofen (ADVIL,MOTRIN) 200 MG tablet Take 200 mg by mouth every 6 (six) hours as needed for Pain.      L. acidophilus/Bifid. animalis (SUPER PROBIOTIC ORAL) Take by mouth.      latanoprostene bunod (VYZULTA) 0.024 % Drop Place 1 drop into both eyes every evening.      LUMIGAN 0.01 % Drop Place 1 drop into both eyes every evening.      [DISCONTINUED] ezetimibe (ZETIA) 10 mg tablet Take 1 tablet (10 mg total) by mouth once daily. 30 tablet 5     No current facility-administered medications on file prior to visit.     Immunization History   Administered Date(s) Administered    COVID-19 MRNA, LN-S PF (MODERNA HALF 0.25 ML DOSE) 12/08/2021       Review of Systems   Constitutional:  Negative for fever, malaise/fatigue and weight loss.   Respiratory:  Negative for shortness of breath.    Cardiovascular:  Negative for chest pain and palpitations.   Gastrointestinal:  Negative for nausea and vomiting.   Musculoskeletal:  Positive for back pain and myalgias.   Psychiatric/Behavioral:  Negative for depression.         Vitals:    12/30/24 0830   BP: 138/88   Pulse: 85   Resp: 18   Temp: 97.8 °F (36.6 °C)       Physical Exam  Vitals reviewed.    Constitutional:       Appearance: Normal appearance.   HENT:      Head: Normocephalic.   Eyes:      Extraocular Movements: Extraocular movements intact.      Conjunctiva/sclera: Conjunctivae normal.      Pupils: Pupils are equal, round, and reactive to light.   Neck:      Thyroid: No thyroid mass or thyromegaly.   Cardiovascular:      Rate and Rhythm: Normal rate and regular rhythm.      Heart sounds: Normal heart sounds. No murmur heard.     No gallop.   Pulmonary:      Effort: Pulmonary effort is normal. No respiratory distress.      Breath sounds: Normal breath sounds. No wheezing or rales.   Skin:     General: Skin is warm and dry.      Coloration: Skin is not jaundiced or pale.   Neurological:      Mental Status: He is alert.   Psychiatric:         Mood and Affect: Mood normal.         Behavior: Behavior normal.         Thought Content: Thought content normal.         Judgment: Judgment normal.          Assessment and Plan  1. Prediabetes  -     Hemoglobin A1C; Future; Expected date: 12/30/2024    2. Need for immunization against influenza  -     influenza (Egg-FREE) (Flucelvax) 45 mcg/0.5 mL IM vaccine (> or = 6 mo) 0.5 mL    3. Hyperlipidemia, unspecified hyperlipidemia type  -     Lipid Panel  -     Comprehensive Metabolic Panel  -     CBC Auto Differential  -     ezetimibe (ZETIA) 10 mg tablet; Take 1 tablet (10 mg total) by mouth once daily.  Dispense: 30 tablet; Refill: 5             Return to clinic in 3 months or as needed once his blood work is in.  He will need a follow-up A1c    Health Maintenance Topics with due status: Not Due       Topic Last Completion Date    Lipid Panel 09/12/2024    Hemoglobin A1c (Diabetic Prevention Screening) 09/24/2024    RSV Vaccine (Age 60+ and Pregnant patients) Not Due

## 2025-01-14 ENCOUNTER — TELEPHONE (OUTPATIENT)
Dept: FAMILY MEDICINE | Facility: CLINIC | Age: 55
End: 2025-01-14
Payer: COMMERCIAL

## 2025-01-14 NOTE — TELEPHONE ENCOUNTER
----- Message from WOLF Larios sent at 1/3/2025  4:51 PM CST -----    ----- Message -----  From: Mitchell Gore MD  Sent: 12/31/2024   7:54 AM CST  To: [facility] Staff    Office visit for triglycerides and LDL.

## 2025-01-29 ENCOUNTER — TELEPHONE (OUTPATIENT)
Dept: FAMILY MEDICINE | Facility: CLINIC | Age: 55
End: 2025-01-29
Payer: COMMERCIAL

## 2025-01-31 ENCOUNTER — OFFICE VISIT (OUTPATIENT)
Dept: FAMILY MEDICINE | Facility: CLINIC | Age: 55
End: 2025-01-31
Payer: COMMERCIAL

## 2025-01-31 VITALS
TEMPERATURE: 98 F | HEIGHT: 69 IN | OXYGEN SATURATION: 97 % | SYSTOLIC BLOOD PRESSURE: 142 MMHG | RESPIRATION RATE: 18 BRPM | DIASTOLIC BLOOD PRESSURE: 82 MMHG | HEART RATE: 86 BPM | BODY MASS INDEX: 35.4 KG/M2 | WEIGHT: 239 LBS

## 2025-01-31 DIAGNOSIS — R06.83 SNORING: ICD-10-CM

## 2025-01-31 DIAGNOSIS — E78.5 HYPERLIPIDEMIA, UNSPECIFIED HYPERLIPIDEMIA TYPE: Primary | ICD-10-CM

## 2025-01-31 PROCEDURE — 3077F SYST BP >= 140 MM HG: CPT | Mod: ,,, | Performed by: FAMILY MEDICINE

## 2025-01-31 PROCEDURE — 1160F RVW MEDS BY RX/DR IN RCRD: CPT | Mod: ,,, | Performed by: FAMILY MEDICINE

## 2025-01-31 PROCEDURE — 1159F MED LIST DOCD IN RCRD: CPT | Mod: ,,, | Performed by: FAMILY MEDICINE

## 2025-01-31 PROCEDURE — 99214 OFFICE O/P EST MOD 30 MIN: CPT | Mod: ,,, | Performed by: FAMILY MEDICINE

## 2025-01-31 PROCEDURE — 3008F BODY MASS INDEX DOCD: CPT | Mod: ,,, | Performed by: FAMILY MEDICINE

## 2025-01-31 PROCEDURE — 3079F DIAST BP 80-89 MM HG: CPT | Mod: ,,, | Performed by: FAMILY MEDICINE

## 2025-01-31 RX ORDER — ATORVASTATIN CALCIUM 20 MG/1
20 TABLET, FILM COATED ORAL NIGHTLY
Qty: 30 TABLET | Refills: 5 | Status: SHIPPED | OUTPATIENT
Start: 2025-01-31 | End: 2026-01-31

## 2025-01-31 NOTE — PROGRESS NOTES
Migel Eng is a 54 y.o. male seen today for lab follow-up.  Unfortunately his triglycerides and LDL were elevated and we discussed adding atorvastatin to his regimen.  He reports no known history of problems with statin medications.  I did recommend over-the-counter Co Q10 with the medication and also improving his diet.    Past Medical History:   Diagnosis Date    Hyperlipidemia      Family History   Problem Relation Name Age of Onset    Cancer Mother      Heart disease Father      Hypertension Father      Cancer Maternal Grandmother       Current Outpatient Medications on File Prior to Visit   Medication Sig Dispense Refill    ezetimibe (ZETIA) 10 mg tablet Take 1 tablet (10 mg total) by mouth once daily. 30 tablet 5    gabapentin (NEURONTIN) 300 MG capsule Take 1 capsule (300 mg total) by mouth 3 (three) times daily. 90 capsule 5    ibuprofen (ADVIL,MOTRIN) 200 MG tablet Take 200 mg by mouth every 6 (six) hours as needed for Pain.      L. acidophilus/Bifid. animalis (SUPER PROBIOTIC ORAL) Take by mouth.      latanoprostene bunod (VYZULTA) 0.024 % Drop Place 1 drop into both eyes every evening.      LUMIGAN 0.01 % Drop Place 1 drop into both eyes every evening.       No current facility-administered medications on file prior to visit.     Immunization History   Administered Date(s) Administered    COVID-19 MRNA, LN-S PF (MODERNA HALF 0.25 ML DOSE) 12/08/2021    Influenza - Trivalent - Flucelvax - PF 12/30/2024       Review of Systems   Constitutional:  Negative for fever, malaise/fatigue and weight loss.   Respiratory:  Negative for shortness of breath.    Cardiovascular:  Negative for chest pain and palpitations.   Gastrointestinal:  Negative for nausea and vomiting.   Psychiatric/Behavioral:  Negative for depression.         Vitals:    01/31/25 0944   BP: (!) 142/82   Pulse: 86   Resp: 18   Temp: 97.9 °F (36.6 °C)       Physical Exam  Vitals reviewed.   Eyes:      Conjunctiva/sclera: Conjunctivae normal.    Pulmonary:      Effort: Pulmonary effort is normal.   Neurological:      Mental Status: He is alert.   Psychiatric:         Mood and Affect: Mood normal.         Behavior: Behavior normal.         Thought Content: Thought content normal.         Judgment: Judgment normal.          Assessment and Plan  1. Hyperlipidemia, unspecified hyperlipidemia type  -     atorvastatin (LIPITOR) 20 MG tablet; Take 1 tablet (20 mg total) by mouth every evening. for cholesterol  Dispense: 30 tablet; Refill: 5  -     CBC Auto Differential; Future; Expected date: 04/30/2025  -     Comprehensive Metabolic Panel; Future; Expected date: 04/30/2025  -     Lipid Panel; Future; Expected date: 04/30/2025             Return to clinic in 3 months for follow-up blood work and then an office visit or as needed.    Health Maintenance Topics with due status: Not Due       Topic Last Completion Date    Hemoglobin A1c (Prediabetes) 12/30/2024    Lipid Panel 12/30/2024    Colorectal Cancer Screening 01/10/2025    RSV Vaccine (Age 60+ and Pregnant patients) Not Due

## 2025-02-28 ENCOUNTER — TELEPHONE (OUTPATIENT)
Dept: FAMILY MEDICINE | Facility: CLINIC | Age: 55
End: 2025-02-28
Payer: COMMERCIAL

## 2025-02-28 NOTE — TELEPHONE ENCOUNTER
----- Message from Noni sent at 2/28/2025  9:59 AM CST -----  Regarding: Pt needs:a walker with seat  Contact: Adelina  Pt needs:a walker with seat.Phone #: [phone]-Adelina

## 2025-02-28 NOTE — TELEPHONE ENCOUNTER
Pt attempted, no answer, left vm to return call. Will need to know how well pt is ambulation prior to ordering rollator.

## 2025-03-25 ENCOUNTER — OFFICE VISIT (OUTPATIENT)
Dept: FAMILY MEDICINE | Facility: CLINIC | Age: 55
End: 2025-03-25
Payer: COMMERCIAL

## 2025-03-25 VITALS
HEART RATE: 98 BPM | OXYGEN SATURATION: 96 % | BODY MASS INDEX: 35.55 KG/M2 | HEIGHT: 69 IN | RESPIRATION RATE: 18 BRPM | TEMPERATURE: 98 F | WEIGHT: 240 LBS | SYSTOLIC BLOOD PRESSURE: 120 MMHG | DIASTOLIC BLOOD PRESSURE: 88 MMHG

## 2025-03-25 DIAGNOSIS — Z00.00 ROUTINE GENERAL MEDICAL EXAMINATION AT A HEALTH CARE FACILITY: Primary | ICD-10-CM

## 2025-03-25 DIAGNOSIS — Z13.220 SCREENING FOR LIPOID DISORDERS: ICD-10-CM

## 2025-03-25 DIAGNOSIS — Z13.1 SCREENING FOR DIABETES MELLITUS: ICD-10-CM

## 2025-03-25 DIAGNOSIS — E78.5 HYPERLIPIDEMIA, UNSPECIFIED HYPERLIPIDEMIA TYPE: ICD-10-CM

## 2025-03-25 DIAGNOSIS — Z28.21 REFUSED STREPTOCOCCUS PNEUMONIAE VACCINATION: ICD-10-CM

## 2025-03-25 PROCEDURE — 1159F MED LIST DOCD IN RCRD: CPT | Mod: ,,, | Performed by: FAMILY MEDICINE

## 2025-03-25 PROCEDURE — 3079F DIAST BP 80-89 MM HG: CPT | Mod: ,,, | Performed by: FAMILY MEDICINE

## 2025-03-25 PROCEDURE — 99396 PREV VISIT EST AGE 40-64: CPT | Mod: ,,, | Performed by: FAMILY MEDICINE

## 2025-03-25 PROCEDURE — 1160F RVW MEDS BY RX/DR IN RCRD: CPT | Mod: ,,, | Performed by: FAMILY MEDICINE

## 2025-03-25 PROCEDURE — 3008F BODY MASS INDEX DOCD: CPT | Mod: ,,, | Performed by: FAMILY MEDICINE

## 2025-03-25 PROCEDURE — 3074F SYST BP LT 130 MM HG: CPT | Mod: ,,, | Performed by: FAMILY MEDICINE

## 2025-03-25 RX ORDER — EZETIMIBE 10 MG/1
10 TABLET ORAL DAILY
Qty: 30 TABLET | Refills: 5 | Status: SHIPPED | OUTPATIENT
Start: 2025-03-25 | End: 2025-09-21

## 2025-03-25 NOTE — PROGRESS NOTES
Migel Eng is a 55 y.o. male seen today for his healthy you.  He reports he is doing well with no chest pain or shortness for breath in his mobility has improved with improvement in his chronic low back pain.  Patient reports he is having no prostate related symptoms and he is up-to-date on his colon cancer screening.    Past Medical History:   Diagnosis Date    Hyperlipidemia      Family History   Problem Relation Name Age of Onset    Cancer Mother      Heart disease Father      Hypertension Father      Cancer Maternal Grandmother       Medications Ordered Prior to Encounter[1]  Immunization History   Administered Date(s) Administered    COVID-19 MRNA, LN-S PF (MODERNA HALF 0.25 ML DOSE) 12/08/2021    Influenza - Trivalent - Flucelvax - PF 12/30/2024       Review of Systems   Constitutional:  Negative for fever, malaise/fatigue and weight loss.   Respiratory:  Negative for shortness of breath.    Cardiovascular:  Negative for chest pain and palpitations.   Gastrointestinal:  Negative for nausea and vomiting.   Psychiatric/Behavioral:  Negative for depression.         Vitals:    03/25/25 1405   BP: 120/88   Pulse: 98   Resp: 18   Temp: 98.3 °F (36.8 °C)       Physical Exam  Vitals reviewed.   Constitutional:       Appearance: Normal appearance.   HENT:      Head: Normocephalic.   Eyes:      Extraocular Movements: Extraocular movements intact.      Conjunctiva/sclera: Conjunctivae normal.      Pupils: Pupils are equal, round, and reactive to light.   Neck:      Thyroid: No thyroid mass or thyromegaly.   Cardiovascular:      Rate and Rhythm: Normal rate and regular rhythm.      Heart sounds: Normal heart sounds. No murmur heard.     No gallop.   Pulmonary:      Effort: Pulmonary effort is normal. No respiratory distress.      Breath sounds: Normal breath sounds. No wheezing or rales.   Skin:     General: Skin is warm and dry.      Coloration: Skin is not jaundiced or pale.   Neurological:      Mental Status: He  is alert.   Psychiatric:         Mood and Affect: Mood normal.         Behavior: Behavior normal.         Thought Content: Thought content normal.         Judgment: Judgment normal.          Assessment and Plan  1. Routine general medical examination at a health care facility    2. Screening for diabetes mellitus  -     Hemoglobin A1C; Future; Expected date: 04/01/2025    3. Screening for lipoid disorders  -     Lipid Panel; Future; Expected date: 04/01/2025    4. Refused Streptococcus pneumoniae vaccination    5. Hyperlipidemia, unspecified hyperlipidemia type  -     ezetimibe (ZETIA) 10 mg tablet; Take 1 tablet (10 mg total) by mouth once daily.  Dispense: 30 tablet; Refill: 5             Return to clinic in 6 months or as needed once his lab work is in.    Health Maintenance Topics with due status: Not Due       Topic Last Completion Date    Hemoglobin A1c (Prediabetes) 12/30/2024    Lipid Panel 12/30/2024    Colorectal Cancer Screening 01/10/2025    RSV Vaccine (Age 60+ and Pregnant patients) Not Due              [1]   Current Outpatient Medications on File Prior to Visit   Medication Sig Dispense Refill    atorvastatin (LIPITOR) 20 MG tablet Take 1 tablet (20 mg total) by mouth every evening. for cholesterol 30 tablet 5    gabapentin (NEURONTIN) 300 MG capsule Take 1 capsule (300 mg total) by mouth 3 (three) times daily. 90 capsule 5    ibuprofen (ADVIL,MOTRIN) 200 MG tablet Take 200 mg by mouth every 6 (six) hours as needed for Pain.      L. acidophilus/Bifid. animalis (SUPER PROBIOTIC ORAL) Take by mouth.      latanoprostene bunod (VYZULTA) 0.024 % Drop Place 1 drop into both eyes every evening.      LUMIGAN 0.01 % Drop Place 1 drop into both eyes every evening.      [DISCONTINUED] ezetimibe (ZETIA) 10 mg tablet Take 1 tablet (10 mg total) by mouth once daily. 30 tablet 5     No current facility-administered medications on file prior to visit.

## 2025-03-26 ENCOUNTER — PATIENT OUTREACH (OUTPATIENT)
Facility: HOSPITAL | Age: 55
End: 2025-03-26
Payer: COMMERCIAL

## 2025-03-26 NOTE — PROGRESS NOTES
Population Health Chart Review & Patient Outreach Details      Further Action Needed If Patient Returns Outreach:            Updates Requested / Reviewed:     []  Care Everywhere    []     []  External Sources ([facility], [facility], [facility], etc.)    [] [facility]   [] [facility]   [] Other:    []  Care Team Updated   []  Removed  or Duplicate Orders   []  Immunization Reconciliation Completed / Queried    [] [place]   [] [place]   [] [place]   [] [place]      Health Maintenance Topics Addressed and Outreach Outcomes / Actions Taken:             Breast Cancer Screening []  Mammogram Order Placed    []  Mammogram Screening Scheduled    []  External Records Requested & Care Team Updated if Applicable    []  External Records Uploaded & Care Team Updated if Applicable    []  Pt Declined Scheduling Mammogram    []  Pt Will Schedule with External Provider / Order Routed & Care Team Updated if Applicable              Cervical Cancer Screening []  Pap Smear Scheduled in Primary Care or OBGYN    []  External Records Requested & Care Team Updated if Applicable       []  External Records Uploaded, Care Team Updated, & History Updated if Applicable    []  Patient Declined Scheduling Pap Smear    []  Patient Will Schedule with External Provider & Care Team Updated if Applicable                  Colorectal Cancer Screening []  Colonoscopy Case Request / Referral / Home Test Order Placed    []  External Records Requested & Care Team Updated if Applicable    []  External Records Uploaded, Care Team Updated, & History Updated if Applicable    []  Patient Declined Completing Colon Cancer Screening    []  Patient Will Schedule with External Provider & Care Team Updated if Applicable    []  Fit Kit Mailed (add the SmartPhrase under additional notes)    []  Reminded Patient to Complete Home Test                Diabetic Eye Exam []  Eye Exam Screening Order Placed    []  Eye Camera Scheduled or Optometry/Ophthalmology Referral  Placed    []  External Records Requested & Care Team Updated if Applicable    []  External Records Uploaded, Care Team Updated, & History Updated if Applicable    []  Patient Declined Scheduling Eye Exam    []  Patient Will Schedule with External Provider & Care Team Updated if Applicable             Blood Pressure Control []  Primary Care Follow Up Visit Scheduled     []  Remote Blood Pressure Reading Captured    []  Patient Declined Remote Reading or Scheduling Appt - Escalated to PCP    []  Patient Will Call Back or Send Portal Message with Reading                 HbA1c & Other Labs []  Overdue Lab(s) Ordered    []  Overdue Lab(s) Scheduled    []  External Records Uploaded & Care Team Updated if Applicable    []  Primary Care Follow Up Visit Scheduled     []  Reminded Patient to Complete A1c Home Test    []  Patient Declined Scheduling Labs or Will Call Back to Schedule    []  Patient Will Schedule with External Provider / Order Routed, & Care Team Updated if Applicable           Primary Care Appointment []  Primary Care Appt Scheduled    []  Patient Declined Scheduling or Will Call Back to Schedule    []  Pt Established with External Provider, Updated Care Team, & Informed Pt to Notify Payor if Applicable           Medication Adherence /    Statin Use []  Primary Care Appointment Scheduled    []  Patient Reminded to  Prescription    []  Patient Declined, Provider Notified if Needed    []  Sent Provider Message to Review to Evaluate Pt for Statin, Add Exclusion Dx Codes, Document   Exclusion in Problem List, Change Statin Intensity Level to Moderate or High Intensity if Applicable                Osteoporosis Screening []  Dexa Order Placed    []  Dexa Appointment Scheduled    []  External Records Requested & Care Team Updated    []  External Records Uploaded, Care Team Updated, & History Updated if Applicable    []  Patient Declined Scheduling Dexa or Will Call Back to Schedule    []  Patient Will Schedule  with External Provider / Order Routed & Care Team Updated if Applicable       Additional Notes:.  Post visit Population Health review of encounter with date of service  3/25/25 with [facility].  Not All required HY components in encounter.  Labs in as future message sent.  Followup appt for: 3/23/26 HY

## 2025-05-02 ENCOUNTER — PATIENT OUTREACH (OUTPATIENT)
Facility: HOSPITAL | Age: 55
End: 2025-05-02
Payer: COMMERCIAL

## 2025-05-02 NOTE — PROGRESS NOTES
Population Health Review...  .Population Health Review...  [facility]/ spreadsheet of denied claims  Nolabs drawn